# Patient Record
Sex: MALE | Race: WHITE
[De-identification: names, ages, dates, MRNs, and addresses within clinical notes are randomized per-mention and may not be internally consistent; named-entity substitution may affect disease eponyms.]

---

## 2017-01-17 NOTE — PAP/PSG TECHNICIAN REPORT
[facility]

Technician Polysomnogram Report



Study name:

None

Report date:

1/17/2017



Study date:

1/16/2017

Referring Physician:

 Dr. Guzman



Name:

JACE LASSITER 

Interpreting Physician:

Magen Molina M.D.



YOB: 1950

Technician:

Leland Johnson RPS.



Sex:

Male







Age:

66

StudyType:

PSG PAP



Weight:

185 lbs









Height:

66 years, Height 5' 10"









BMI:

26.54







Medications:

ASPIRIN 81 MG, ATORVASTATIN CALCIUM 20 MG, OMEPRAZOLE 40 MG, 

















Patient History





PATIENT RECENTLY HAD A HOME SLEEP STUDY AND WAS POSITIVE FOR DAVID WITH AN AHI OF 11.6/HR. HE IS HERE 
TODAY FOR A CPAP TITRATION. ESS = 5 RM 6









Parameters Monitored

NPSG: E1-M2, E2-M1, Fp1-M2, Fp2-M1, F3-M2, F4-M2, F4-M1, C3-M2, C4-M2, C4-M1, O1-M2, O2-M2,

O2-M1, T3-M2, T4-M1, P3-M2, P4-M1, CHIN1, CHIN2, HR, EKG, Legs, PFLOW, SNOR, FLOW, CFLOW,

Tidal Volume, THOR, ABDO, SpO2, PLTH, CPRESS, ETCO2 Wave, ETCO2, pH





Sleep Architecture



Sleep Stages



Time at Lights Off

11:18:13 PM



STAGES

Time (min.)

TST (%)



Time at Lights On

6:13:13 AM



Wake

119.5

--



Total Recording Time (TRT)

415.50 min.



N1

23.5

8



Total Sleep Period (TSP)

387.5 min.



N2

189.0

64



Total Sleep Time (TST)

295.5min.



N3

44.5

15



Awake Time

120.0 min.



REM

38.5

13



Wake after Sleep Onset

92.0 min.











Sleep Efficiency (SE)

71 %











Sleep Onset Latency (SOL)

27.5 min.











Number of Stage 1 Shifts

None











Awakenings

19











Stage Changes

90











Number of REM periods

3



REM

38.5

13



REM Latency

128.0 min.



NREM

257.0

87





Body Position Analysis





Supine

Right

Left

Side

Prone

Vertical



Total Sleep Time (min.)

150.8

52.0

170.1

222.08

0.0

0.0



Total Sleep Time (%)

25%

18%

58%

75

0%

N/A%



Total Sleep Time REM (min.)

0.0

0.0

38.5

None

0.0

0.0



Total Sleep Time NREM (min.)

73.4

52.0

131.6

None

0.0

0.0



Intermittent Wake (min.)

77.4

11.6

30.5

None

0.0

0.0



Total Sleep Period (%)

34%

None





Arousals







Myoclonus (PLM) *







Events

Count

Index



Events

Count

Index



Spontaneous

42

9



Events Awake (PLMW)

42

21.1



Respiratory

15

3.0



Events Asleep w/ Arousal (PLMA)

9

1.8



PLM

9

2



Events Asleep w/o Arousal (PLMS)

58

11.8



Snoring

4

1



Total Asleep

67

13.6



Total

70

14



Total

109

16







Respiratory Analysis *





CA

OA

MA

CH

H

RERA

Total



Count

0

13



Index

0.0

0

2.6

3

5.3



Mean Duration

0.0

0.00

26.7

16.1

21.4



Longest Duration

0.0

0.00

0.0

19.4

55.0







Respiratory Event Summary 







Total

Supine

~Supine

Right

Left

Prone

REM

NREM



Apneas

Count

0

N/A

0

0





Index

0.0

0

0

0.0

0.0

N/A

0

0



Hypopneas (4% Desat)

Count

13

0

13

0

13

N/A

8

5





Index

2.6

0.0

4

0.0

4.6

N/A

12.5

1.2



Apneas & All Hypopneas 

Count

13

0

13

0

13

N/A

8

5





Index

2.6

0

4

0

5

N/A

12.5

1.2



Respiratory Events 

(Apn+All Hyp+RERA)

Count

13

9

17

0

17

N/A

8

5





Index

5.3

7

5

0.0

6.0

N/A

12.5

4.2



Respiratory Related Arousal

Count

15

9

6

0

6

N/A

1

14





Index

3.0

7

2

0

2

N/A

2

3





Snoring Analysis





Supine

Right

Left

Prone

REM

NREM

Total



Snore duration

7.7 min



Snores count

25

1

287

N/A

103

210

313



Snore mean duration

1.5 Sec



Snores index

20

1

101

N/A

160.5

49.0

63.6



TST with snoring (%)

2.6%











Desaturation Event Summary:



Minimum %SpO2

Event Count

Mean/Min/Max Duration(sec.)

Desaturation Index

% Time In Bed



> 90

13

47.3 / 17.3 / 117.0

2.0

98.0



86 - 90

0

N/A

0.0

1.9



81 - 85

0

N/A

0.0

0.1



76 - 80

0

N/A

0.0

0.0



71 - 75

0

N/A

0.0

0.0



66 - 70

0

N/A

0.0

0.0



61 - 65

0

N/A

0.0

0.0



56 - 60

0

N/A

0.0

0.0



51 - 55

0

N/A

0.0

0.0



< 50

0

N/A

0.0

0.0









Total

REM

NREM

Awake



<50%

0.0 min.



51 - 60%

0.0 min.



61 - 70%

0.0 min.



71 - 80%

0.1 min.

0.0 min.

0.0 min.

0.1 min.



81 - 90%

8.1 min.

3.8 min.

3.8 min.

0.5 min.



91 - 100%

399.3 min.

34.7 min.

253.2 min.

111.4 min.



Average

94

93

93

95



Minimum SpO2

76

87

84

76



Desaturation Event Index

1.9

12.5

1.2

0.0



# Desat. Events below 89%

4

3

1

N/A



Time(%) with Saturation below 89%

0.5

0.4

0.2

0.0



Time(min.) with Saturation below 89%

2.2

1.5

0.7

0.1









Time (mins)

REM (mins)

NREM (mins)

% of TST



SpO2 Below 90%

9

6

N3

1.1



SpO2 Below 88%

3

0





Heart Rate Analysis









Min (bpm)

Max (bpm)

Average (bpm)



Awake

51

81

59



NREM

50

76

56



REM

50

65

56



Overall

50

76

56











































Supplemental O2 Values



Minimum O2 level: None



Value  

Start Time

End Time





Technician Comments





Mr. Lassiter slept in the right, left and supine positions.  PVC's and PAC's noted. Leg movements 
noted.  No bruxism noted.  CPAP was initiated at +4 CMH2O and up-titrated to an optimal level of + 
12 CMH2O, which nearly eliminated all respiratory events and snoring.  A ResMed F10 full face size 
medium mask was used during titration   Mr. Lassiter awoke to use the restroom 1 time during the 
night.  Mr. Lassiter stated I did not sleep as well as I do when I am in my own bed.

 

The final report will be interpreted and signed by a sleep physician. The completed physician report 
will then be placed in the patient medical record.











 



 



 



 



 



 



 

 



Therapy Event:



Therapy (cm H20)

4

5

6

7

9

10

11

12



Total Time at Pressure (min.)

90.7

42.9

13.1

11.0

20.8

54.7

89.2

92.7



TST at Pressure (min.)

60.2

33.9

13.1

11.0

20.3

50.7

19.2

87.2



# Periods

1



Sleep Onset (min.)

27.5

0.0



REM Onset (min.)

N/A

8.7

0.0

N/A

N/A

34.2



Sleep Efficiency %

66

79

100

100

97

92

21

94



Wakefulness (%)

33.6

21.0

0.0

0.0

2.4

7.3

78.5

5.9



Wakefulness (min.)

30.5

9.0

0.0

0.0

0.5

4.0

70.0

5.5



NREM 1 (%)

5.0

11.6

0.0

4.6

2.4

5.5

5.4

5.6



NREM 1 (min.)

4.5

5.0

0.0

0.5

0.5

3.0

4.8

5.2



NREM 2 (%)

32.8

67.4

94.2

63.8

28.9

72.6

16.1

55.0



NREM 2 (min.)

29.7

28.9

12.4

7.0

6.0

39.7

14.3

51.0



NREM 3 (%)

28.7

0.0

5.8

11.3

0.0

14.6

0.0

9.2



NREM 3 (min.)

26.0

0.0

0.8

1.2

0.0

8.0

0.0

8.5



REM (%)

0.0

20.4

66.3

0.0

0.0

24.3



REM (min.)

0.0

2.2

13.8

0.0

0.0

22.5



# Arousals

12

12

4

5

3

10

8

16



Arousal Index

12.0

21.2

18.3

27.3

8.9

11.8

25.1

11.0



# Snore

21

17

78

103

78

4

5

7



Snore Index

20.9

30.1

356.8

563.2

230.9

4.7

15.7

4.8



AHI

0.0

3.5

4.6

21.9

17.8

0.0



AHI Supine

0.0

0.0

N/A

0.0



AHI Non-Supine

0.0

5.3

4.6

21.9

17.8

0.0



NREM AHI

0.0

3.5

4.6

13.7

0.0



REM AHI

N/A

53.7

26.2

N/A

N/A

0.0



RDI

0.0

8.8

13.7

27.3

17.8

2.4

12.5

0.7



# Obstructive

0



# Central Ap

0



# Mixed

0



# Hypopneas

0

2

1

4

6

0



RERAS

0

3

2

1

0

2

4

1



Total Respiratory Events

0

5

3

5

6

2

4

1



Time Below SpO2 89.00% (min.)

0.0

1.5

0.6

0.0



Mean NREM SpO2 (%)

93

92

92

91

94

94

93

94



Mean REM SpO2 (%)

N/A

90

92

N/A

N/A

93



Mean Sleep SpO2 (%)

93

92

92

91

93

94

93

94



Min NREM SpO2 (%)

90

84

91

92

91

93



Min REM SpO2 (%)

N/A

88

87

N/A

N/A

92



Position Supine (min.)

8.2

11.3

0.0

4.6

18.8

30.5



Position Non-supine (min.)

52.0

22.6

13.1

11.0

20.3

46.1

0.3

56.7



LM Index Sleep

18.9

23.0

13.7

16.4

14.8

16.6

0.0

6.9



LM Index NREM

18.9

23.0

13.7

13.7

0.0

16.6

0.0

5.6



LM Index REM

N/A

26.8

21.8

N/A

N/A

10.7



Mean Heart Rate (bpm)

57

56

57

58

58

55

56

54



Min Heart Rate (bpm)

53

52

52

54

53

51

52

50

## 2017-01-18 NOTE — POLYSOMNOGRAPH REPORT
CLINICAL DATA:  A 66-year-old male with BMI of 26.54 referred by Dr. Graham for

CPAP titration study.  He had a home sleep study done which showed mild DAVID

with an AHI of 11.6.

 

SLEEP ARCHITECTURE:  Total sleep period was 387.5 minutes.  Total sleep time

was 295.5 minutes divided between 257 minutes of non-REM sleep and 38.5

minutes of REM sleep.  Sleep onset latency was 27.5 minutes.  REM latency was

128 minutes.  Sleep efficiency was 71%.  Awake after sleep onset was 92

minutes.  Sleep consisted of stage N1 8%, N2 64%, N3 15%, REM 13%.

 

AROUSAL DATA:  Seventy arousals were recorded for an index of 14 per hour.

 

PLM DATA:  Sixty seven limb movements during sleep were noted for an index of

13.6 per hour with arousal index of 1.8 per hour.

 

RESPIRATORY DATA:  The AHI was 2.6.  There were 13 hypopneic episodes.  The

mean duration of hypopnea was 26.7 seconds.

 

OXIMETRY DATA:  Nocturnal hypoxemia was seen.  Oxygen larry was 84% during

non-REM sleep.  The mean saturation was 94%.  Time below 88% was 3 minutes.

 

EKG:  Heart rates ranged from 50-76 beats per minute.  PACs and PVCs were

noted.

 

TECHNICIAN'S COMMENTS AND TREATMENT SUMMARY:  The patient slept in the right,

left, and supine positions.  He was started using a ResMed F10 full face

mask, size medium.  He was begun on CPAP and was titrated up to his final

pressure setting of 12 cm water pressure.  At 12 cm of water pressure, the

patient slept for 87.2 minutes with an AHI of 0.7.

 

IMPRESSION:  Mild sleep apnea/hypopnea corrected with CPAP at 12 cm of water

pressure ResMed F10 full face mask, size medium.

 

RECOMMENDATIONS:  The patient could be started on the above noted treatment

regimen and seen back in followup within 90 days to document efficacy and

compliance.

 

 

[facility]

## 2017-04-21 NOTE — DIAGNOSTIC IMAGING REPORT
MRI OF THE BRAIN WITHOUT AND WITH IV CONTRAST



CLINICAL HISTORY: Dysphagia. HISTORY OF ABNORMAL BRAIN CT.



COMPARISON STUDY:  Head CT dated 8/2/2016 



TECHNIQUE: MRI of the brain was performed from the vertex to the skull base

utilizing various T1 and T2 weighted sequences. Following the IV administration

of 7.5 mL of Gadavist contrast, additional enhanced images were obtained.



FINDINGS: 

Sagittal T1, axial diffusion, proton density and T2 weighted axial, coronal

FLAIR, and pre and post axial T1-weighted images were acquired. These were

supplemented with post gadolinium coronal T1 weighted images. 

No intra or extra-axial mass lesions are visualized.

Axial diffusion-weighted images reveal no evidence of acute or subacute

infarction.

There is no evidence of ventricular dilatation.

Proton density T2-weighted and FLAIR images reveal an old lacunar infarct in the

right cerebellum.

There are no abnormal flow voids.

There is no evidence of pathologic enhancement.





IMPRESSION:  

1. 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

2. No acute intracranial findings. No evidence of intracranial mass. No evidence

of acute or subacute infarction.







Electronically signed by:  Mateusz Dover M.D.

4/21/2017 12:11 PM



Dictated Date/Time:  4/21/2017 12:08 PM

## 2018-04-19 ENCOUNTER — HOSPITAL ENCOUNTER (OUTPATIENT)
Dept: HOSPITAL 45 - C.RAD1850 | Age: 68
Discharge: HOME | End: 2018-04-19
Attending: FAMILY MEDICINE
Payer: COMMERCIAL

## 2018-04-19 DIAGNOSIS — M25.571: Primary | ICD-10-CM

## 2018-04-19 NOTE — DIAGNOSTIC IMAGING REPORT
R ANKLE MIN 3 VIEWS ROUTINE



CLINICAL HISTORY: M25.571 pain



COMPARISON: None.



DISCUSSION: The bones and joint spaces appear intact. There is no evidence of

fracture, dislocation or bony disease. There is no evidence for soft tissue

swelling. Small heel spur



IMPRESSION: Negative study. Small heel spur







The above report was generated using voice recognition software.  It may contain

grammatical, syntax or spelling errors.







Electronically signed by:  Temo Santoyo M.D.

4/19/2018 2:11 PM



Dictated Date/Time:  4/19/2018 2:11 PM

## 2018-04-19 NOTE — DIAGNOSTIC IMAGING REPORT
R FOOT MIN 3 VIEWS ROUTINE



CLINICAL HISTORY: M25.571 pain



COMPARISON: None.



DISCUSSION: Rather significant degenerative change first metatarsophalangeal

joint. Mild degenerative change of the intertarsal as well as tarsal navicular

joints. Small heel spur. Moderate degenerative changes second

metatarsophalangeal joint.



IMPRESSION: Moderate to rather significant degenerative change primarily

involving the first and to lesser extent second toes. Small heel spur.











The above report was generated using voice recognition software.  It may contain

grammatical, syntax or spelling errors.







Electronically signed by:  Temo Santoyo M.D.

4/19/2018 2:21 PM



Dictated Date/Time:  4/19/2018 2:19 PM